# Patient Record
Sex: MALE | Race: WHITE | NOT HISPANIC OR LATINO
[De-identification: names, ages, dates, MRNs, and addresses within clinical notes are randomized per-mention and may not be internally consistent; named-entity substitution may affect disease eponyms.]

---

## 2017-11-15 ENCOUNTER — TRANSCRIPTION ENCOUNTER (OUTPATIENT)
Age: 22
End: 2017-11-15

## 2022-06-21 PROBLEM — Z00.00 ENCOUNTER FOR PREVENTIVE HEALTH EXAMINATION: Status: ACTIVE | Noted: 2022-06-21

## 2022-06-29 ENCOUNTER — FORM ENCOUNTER (OUTPATIENT)
Age: 27
End: 2022-06-29

## 2022-06-30 ENCOUNTER — APPOINTMENT (OUTPATIENT)
Dept: ORTHOPEDIC SURGERY | Facility: CLINIC | Age: 27
End: 2022-06-30

## 2022-06-30 ENCOUNTER — APPOINTMENT (OUTPATIENT)
Dept: MRI IMAGING | Facility: CLINIC | Age: 27
End: 2022-06-30

## 2022-06-30 VITALS — WEIGHT: 185 LBS | HEIGHT: 76 IN | BODY MASS INDEX: 22.53 KG/M2

## 2022-06-30 DIAGNOSIS — Z78.9 OTHER SPECIFIED HEALTH STATUS: ICD-10-CM

## 2022-06-30 DIAGNOSIS — S39.012A STRAIN OF MUSCLE, FASCIA AND TENDON OF LOWER BACK, INITIAL ENCOUNTER: ICD-10-CM

## 2022-06-30 PROCEDURE — 72148 MRI LUMBAR SPINE W/O DYE: CPT

## 2022-06-30 PROCEDURE — 73502 X-RAY EXAM HIP UNI 2-3 VIEWS: CPT | Mod: RT

## 2022-06-30 PROCEDURE — 72100 X-RAY EXAM L-S SPINE 2/3 VWS: CPT

## 2022-06-30 PROCEDURE — 99203 OFFICE O/P NEW LOW 30 MIN: CPT

## 2022-06-30 RX ORDER — METHYLPREDNISOLONE 4 MG/1
4 TABLET ORAL
Qty: 1 | Refills: 0 | Status: ACTIVE | COMMUNITY
Start: 2022-06-30 | End: 1900-01-01

## 2022-06-30 NOTE — DATA REVIEWED
[MRI] : MRI [Lumbar Spine] : lumbar spine [I independently reviewed and interpreted images and report] : I independently reviewed and interpreted images and report [FreeTextEntry1] : 23.Aug.2018; Location: [facility] \par • 27.Aug.2018 This was reviewed. There is small right paracentral disc protrusion at L1-2. Degenerative disc disease at L5-S1 with mild central herniation \par

## 2022-06-30 NOTE — IMAGING
[de-identified] : The patient ambulates with  right antalgic gait\par \par Lumbosacral spine\par Inspection normal\par Mild to moderate tenderness paraspinals right lumbosacral region\par Straight leg raising is positive for lower back and leg pain at 30° on the right\par Motor exam lower extremities normal\par \par Hips\par No tenderness.\par No pain with passive range of motion\par \par Legs\par No swelling\par Calves are soft and nontender\par Posterior tibial pulse 2+ bilaterally [de-identified] : Reviewed and interpreted.  Pelvis AP with lateral right hip-negative [FreeTextEntry1] : Reviewed and interpreted.  Lumbosacral spine AP and lateral views-mild disc space narrowing at L5-S1

## 2022-06-30 NOTE — PHYSICAL EXAM
[Normal Mood and Affect] : normal mood and affect [Able to Communicate] : able to communicate [Well Developed] : well developed [Well Nourished] : well nourished

## 2022-06-30 NOTE — HISTORY OF PRESENT ILLNESS
[Lower back] : lower back [Right Leg] : right leg [Gradual] : gradual [8] : 8 [3] : 3 [Radiating] : radiating [Sharp] : sharp [Intermittent] : intermittent [Household chores] : household chores [Leisure] : leisure [Rest] : rest [Standing] : standing [Walking] : walking [Full time] : Work status: full time [de-identified] : Patient is a 26-year-old male with recurrence of lower back pain radiating down right leg over the past several weeks.  No injury.  He has moderate sharp pain radiating down his right leg.  Pain when bending and setting.  Pain when walking.  No numbness.  Mild weakness.  No loss of bowel bladder control.  Taking Tylenol p.r.n.  Seen today with his mother, Jayce [] : Post Surgical Visit: no [FreeTextEntry7] : R Upper Thigh  [de-identified] : 8/27/2018 [de-identified] : Dr. Roman  [de-identified] : 8/2018 [de-identified] : MRI [de-identified] :

## 2022-06-30 NOTE — DISCUSSION/SUMMARY
[Medication Risks Reviewed] : Medication risks reviewed [de-identified] : The patient will have MRI lumbosacral spine today\par He will try Medrol Dosepak.  He was instructed in how to take this\par He has sensitive stomach.  He will take Pepcid 20 mg q.d.\par The day after he finishes Medrol Dosepak, he can try Advil or Aleve p.r.n.\par Tylenol p.r.n.\par Methocarbamol 1-2 q.8 h. p.r.n. pain/spasm.  Not to take this if he is driving or needs to be alert\par If for any reason he develops any significant neurologic changes, significant weakness and lower extremities, loss of bowel bladder control, advised to go to emergency room immediately\par He is referred for physical therapy program.  He lives in [place], [place] and will find therapist there\par \par Impression:\par Lumbosacral strain/radiculopathy

## 2022-07-05 ENCOUNTER — NON-APPOINTMENT (OUTPATIENT)
Age: 27
End: 2022-07-05

## 2022-07-07 ENCOUNTER — APPOINTMENT (OUTPATIENT)
Dept: ORTHOPEDIC SURGERY | Facility: CLINIC | Age: 27
End: 2022-07-07

## 2022-07-07 VITALS — WEIGHT: 185 LBS | HEIGHT: 76 IN | BODY MASS INDEX: 22.53 KG/M2

## 2022-07-07 PROCEDURE — 99214 OFFICE O/P EST MOD 30 MIN: CPT

## 2022-07-08 NOTE — DATA REVIEWED
[MRI] : MRI [Lumbar Spine] : lumbar spine [I independently reviewed and interpreted images and report] : I independently reviewed and interpreted images and report [FreeTextEntry1] : MRI lumbosacral spine done June 30, 2022 was reviewed. There is mild to moderate right paracentral disc herniation with extrusion at L5-S1. Also findings suggesting perineural cyst right neural foramen measuring approximately 8 mm which appears nonaggressive \par

## 2022-07-08 NOTE — IMAGING
[de-identified] : The patient ambulates with  right antalgic gait\par \par Lumbosacral spine\par Inspection normal\par Mild to moderate tenderness paraspinals right lumbosacral region\par Straight leg raising is positive for lower back and leg pain at 40° on the right\par Motor exam lower extremities normal\par \par Hips\par No tenderness.\par No pain with passive range of motion\par \par Legs\par No swelling\par Calves are soft and nontender\par Posterior tibial pulse 2+ bilaterally

## 2022-07-08 NOTE — DISCUSSION/SUMMARY
[Medication Risks Reviewed] : Medication risks reviewed [de-identified] : MRI lumbosacral spine was discussed with the patient and his mother\par Meloxicam 15 mg q.d. with food p.r.n.\par He has sensitive stomach.  He will take Pepcid 20 mg q.d.\par Tylenol p.r.n.\par Methocarbamol 1-2 q.8 h. p.r.n. pain/spasm.  Not to take this if he is driving or needs to be alert\par If for any reason he develops any significant neurologic changes, significant weakness and lower extremities, loss of bowel bladder control, advised to go to emergency room immediately\par He is referred for physical therapy program.  He lives in [place], [place] and will find therapist there\par He will have pain management consult\par Recommend he have spine consult with Dr. Dileep Lowery for possible discectomy if ongoing symptoms\par \par Impression:\par Lumbosacral strain/radiculopathy

## 2022-07-08 NOTE — HISTORY OF PRESENT ILLNESS
[Lower back] : lower back [Gradual] : gradual [8] : 8 [Radiating] : radiating [Sharp] : sharp [Constant] : constant [Household chores] : household chores [Sleep] : sleep [Full time] : Work status: full time [de-identified] : The patient has had minimal improvement after completing Medrol Dosepak.  He had MRI lumbosacral spine.\par He has moderate sharp pain radiating down his right leg.  Pain when standing and walking.  Pain when bending and sitting.  Mild weakness right leg.  No numbness.  Seen today with his mother, Jayce [] : Post Surgical Visit: no [FreeTextEntry7] : R Leg  [de-identified] : MRI

## 2022-07-11 ENCOUNTER — APPOINTMENT (OUTPATIENT)
Dept: ORTHOPEDIC SURGERY | Facility: CLINIC | Age: 27
End: 2022-07-11

## 2022-07-11 VITALS — WEIGHT: 185 LBS | BODY MASS INDEX: 22.53 KG/M2 | HEIGHT: 76 IN

## 2022-07-11 DIAGNOSIS — Z78.9 OTHER SPECIFIED HEALTH STATUS: ICD-10-CM

## 2022-07-11 PROCEDURE — 99214 OFFICE O/P EST MOD 30 MIN: CPT

## 2022-07-11 NOTE — DISCUSSION/SUMMARY
[de-identified] : reviewed the MRi with him - L5-S1 disc herniation - discussion of tx options \par \par PT/pain management \par \par if not getting better would consider for right sided decompression at L5-S1 - fu with me if not getting better conservative tx \par \par

## 2022-07-11 NOTE — HISTORY OF PRESENT ILLNESS
[Lower back] : lower back [8] : 8 [7] : 7 [Sharp] : sharp [Constant] : constant [Full time] : Work status: full time [de-identified] : 7/11/22:27 yo M here for eval of low back pain for 2 months. Consult from Dr Roman. Denies injury. Has had a hx of minor back problems in years past. \par \par He sent MDP/ muscle relaxer/ NSAID which has been some help and got MRI. \par \par office \par \par healthy otherwise \par \par Never seen Chiro/ acupuncture  \par Starting PT and seeing Pain management \par \par L-Spine MRI: 1. Broad-based right paracentral extrusion with caudad extension along with disc building at L5-S1 resulting in \par impingement with posterior displacement on right greater than left S1 nerve roots and right greater than left \par lateral recess stenosis as well as probable impingement upon the right greater than left exiting L5 nerve roots \par and right greater than left foraminal narrowing. There is a peroneal cyst in the right neuroforamen, which \par appears nonaggressive.\par 2. Small right paracentral protrusion effacing the thecal sac at L1-L2, slight convexity of the upper lumbar spine \par towards the left, and slight degenerative endplate changes asymmetric towards the left at L5-S1.\par \par  [] : no [FreeTextEntry5] : no specific injury- woke up with pain 2 months ago in lower back [FreeTextEntry7] : R leg [de-identified] : [employer]k [employer]

## 2022-07-15 ENCOUNTER — APPOINTMENT (OUTPATIENT)
Dept: PAIN MANAGEMENT | Facility: CLINIC | Age: 27
End: 2022-07-15

## 2022-07-15 VITALS — BODY MASS INDEX: 23.14 KG/M2 | HEIGHT: 76 IN | WEIGHT: 190 LBS

## 2022-07-15 DIAGNOSIS — M54.50 LOW BACK PAIN, UNSPECIFIED: ICD-10-CM

## 2022-07-15 DIAGNOSIS — M54.17 RADICULOPATHY, LUMBOSACRAL REGION: ICD-10-CM

## 2022-07-15 PROCEDURE — 99204 OFFICE O/P NEW MOD 45 MIN: CPT

## 2022-07-15 NOTE — PHYSICAL EXAM
[de-identified] : Constitutional; Appears well, no apparent distress\par Ability to communicate: Normal \par Respiratory: non-labored breathing\par Skin: No rash noted\par Head: Normocephalic, atraumatic\par Neck: no visible thyroid enlargement\par Eyes: Extraocular movements intact\par Neurologic: Alert and oriented x3\par Psychiatric: normal mood, affect and behavior  [Flexion] : flexion [] : non-antalgic

## 2022-07-15 NOTE — HISTORY OF PRESENT ILLNESS
[Lower back] : lower back [8] : 8 [3] : 3 [Radiating] : radiating [Sharp] : sharp [Tingling] : tingling [Squeezing] : squeezing [Intermittent] : intermittent [Household chores] : household chores [Meds] : meds [Walking] : walking [] : no [FreeTextEntry1] : right  [FreeTextEntry7] : right post thigh  [FreeTextEntry9] : walking long time, meloxicam  [de-identified] : morning  [de-identified] : L MRI

## 2022-07-15 NOTE — DISCUSSION/SUMMARY
[de-identified] : After discussing various treatment options with the patient including but not limited to oral medications, physical therapy, exercise modalities as well as interventional spinal injections, we have decided with the following plan:\par \par - Continue Home exercises, stretching, activity modification, physical therapy, and conservative care.\par - MRI report and/or images was reviewed and discussed with the patient.\par - Recommend Right L5-S1 & S1 Transforaminal Epidural Steroid Injection under fluoroscopic guidance with image.\par - The risks, benefits and alternatives of the proposed procedure were explained in detail with the patient. The risks outlined include but are not limited to infection, bleeding, post-dural puncture headache, nerve injury, a temporary increase in pain, failure to resolve symptoms, allergic reaction, symptom recurrence, and possible elevation of blood sugar in diabetics. All questions were answered to patient's apparent satisfaction and he/she verbalized an understanding.\par - Patient is presenting with acute/sub-acute radicular pain with impairment in ADLs and functionality.  The pain has not responded to conservative care including NSAID therapy and/or physical therapy.  There is no bleeding tendency, unstable medical condition, or systemic infection.\par - Follow up in 1-2 weeks post injection for re-evaluation.\par

## 2022-08-15 ENCOUNTER — APPOINTMENT (OUTPATIENT)
Dept: PAIN MANAGEMENT | Facility: CLINIC | Age: 27
End: 2022-08-15

## 2022-08-30 ENCOUNTER — APPOINTMENT (OUTPATIENT)
Dept: PAIN MANAGEMENT | Facility: CLINIC | Age: 27
End: 2022-08-30

## 2022-09-14 ENCOUNTER — APPOINTMENT (OUTPATIENT)
Dept: ORTHOPEDIC SURGERY | Facility: CLINIC | Age: 27
End: 2022-09-14

## 2022-09-22 ENCOUNTER — APPOINTMENT (OUTPATIENT)
Dept: ORTHOPEDIC SURGERY | Facility: CLINIC | Age: 27
End: 2022-09-22

## 2022-09-22 VITALS — BODY MASS INDEX: 23.14 KG/M2 | HEIGHT: 76 IN | WEIGHT: 190 LBS

## 2022-09-22 DIAGNOSIS — S39.012D STRAIN OF MUSCLE, FASCIA AND TENDON OF LOWER BACK, SUBSEQUENT ENCOUNTER: ICD-10-CM

## 2022-09-22 PROCEDURE — 99214 OFFICE O/P EST MOD 30 MIN: CPT

## 2022-09-22 RX ORDER — MELOXICAM 15 MG/1
15 TABLET ORAL
Qty: 30 | Refills: 1 | Status: ACTIVE | COMMUNITY
Start: 2022-07-07 | End: 1900-01-01

## 2022-09-22 RX ORDER — METHOCARBAMOL 500 MG/1
500 TABLET, FILM COATED ORAL
Qty: 60 | Refills: 1 | Status: ACTIVE | COMMUNITY
Start: 2022-06-30 | End: 1900-01-01

## 2022-09-22 NOTE — HISTORY OF PRESENT ILLNESS
[Lower back] : lower back [Gradual] : gradual [7] : 7 [Dull/Aching] : dull/aching [Radiating] : radiating [Intermittent] : intermittent [Rest] : rest [Sitting] : sitting [Full time] : Work status: full time [de-identified] : The patient has continued mild to moderate pain in his right lower back radiating down his right leg.  Pain when standing and walking.  Pain when bending and sitting.  No weakness.  Taking meloxicam and methocarbamol p.r.n. He did have some improvement after physical therapy.  He is scheduled for epidural injection with Dr. Erich De La Rosa on October 12 [] : Post Surgical Visit: no [FreeTextEntry7] : R Leg  [de-identified] : 7/15/2022 [de-identified] : Dr. De La Rosa [de-identified] : 9/20/2022 [de-identified] : MRI

## 2022-09-22 NOTE — DISCUSSION/SUMMARY
[de-identified] : Continue physical therapy 2-3 times a week\par Continue meloxicam 15 mg q.d. with food p.r.n.\par Continue Pepcid 20 mg q.d.\par Continue methocarbamol 1-2 q.8 h. p.r.n. pain/spasm.  Not to take this if he is driving or needs to be alert\par He is going to proceed with epidural injection with Dr. Erich De La Rosa on October 12\par If he is not improving, recommend he has followup consult with Dr. Dileep Lowery to discuss surgery\par If for any reason he develops any significant neurologic changes, weakness and lower extremities, loss of bowel bladder control, advised to go to emergency room immediately\par \par Impression:\par Lumbosacral radiculopathy/herniated disc right L5-S1

## 2022-09-22 NOTE — IMAGING
[de-identified] : The patient ambulates with  right antalgic gait\par \par Lumbosacral spine\par Inspection normal\par Mild tenderness paraspinals right lumbosacral region\par Straight leg raising is positive for lower back and leg pain at 40° on the right\par Motor exam lower extremities normal\par \par Hips\par No tenderness.\par No pain with passive range of motion\par \par Legs\par No swelling\par Calves are soft and nontender\par Posterior tibial pulse 2+ bilaterally

## 2022-10-09 ENCOUNTER — NON-APPOINTMENT (OUTPATIENT)
Age: 27
End: 2022-10-09

## 2022-10-12 ENCOUNTER — APPOINTMENT (OUTPATIENT)
Age: 27
End: 2022-10-12

## 2022-10-12 PROCEDURE — 64484 NJX AA&/STRD TFRM EPI L/S EA: CPT | Mod: 59,RT

## 2022-10-12 PROCEDURE — 64483 NJX AA&/STRD TFRM EPI L/S 1: CPT | Mod: RT

## 2022-10-28 ENCOUNTER — APPOINTMENT (OUTPATIENT)
Dept: PAIN MANAGEMENT | Facility: CLINIC | Age: 27
End: 2022-10-28

## 2022-10-28 VITALS — HEIGHT: 76 IN | BODY MASS INDEX: 23.14 KG/M2 | WEIGHT: 190 LBS

## 2022-10-28 DIAGNOSIS — M51.26 OTHER INTERVERTEBRAL DISC DISPLACEMENT, LUMBAR REGION: ICD-10-CM

## 2022-10-28 DIAGNOSIS — M54.16 RADICULOPATHY, LUMBAR REGION: ICD-10-CM

## 2022-10-28 PROCEDURE — 99213 OFFICE O/P EST LOW 20 MIN: CPT

## 2022-10-28 NOTE — DISCUSSION/SUMMARY
[de-identified] : After discussing various treatment options with the patient including but not limited to oral medications, physical therapy, exercise modalities as well as interventional spinal injections, we have decided with the following plan:\par \par - Continue Home exercises, stretching, activity modification, physical therapy, and conservative care.\par - MRI report and/or images was reviewed and discussed with the patient.\par - Recommend L5-S1 Lumbar Epidural Steroid Injection under fluoroscopic guidance with image. (RIGHT)\par - The risks, benefits and alternatives of the proposed procedure were explained in detail with the patient. The risks outlined include but are not limited to infection, bleeding, post-dural puncture headache, nerve injury, a temporary increase in pain, failure to resolve symptoms, allergic reaction, symptom recurrence, and possible elevation of blood sugar in diabetics. All questions were answered to patient's apparent satisfaction and he/she verbalized an understanding.\par - Patient is presenting with acute/sub-acute radicular pain with impairment in ADLs and functionality.  The pain has not responded to conservative care including NSAID therapy and/or physical therapy.  There is no bleeding tendency, unstable medical condition, or systemic infection.\par - Follow up in 1-2 weeks post injection for re-evaluation.\par - Will call to schedule.\par \par

## 2022-10-28 NOTE — HISTORY OF PRESENT ILLNESS
[Lower back] : lower back [Radiating] : radiating [Intermittent] : intermittent [Meds] : meds [Walking] : walking [3] : 3 [Dull/Aching] : dull/aching [Rest] : rest [Injection therapy] : injection therapy [] : no [FreeTextEntry1] : right  [FreeTextEntry7] : right post thigh  [de-identified] : morning  [FreeTextEntry9] : walking long time [de-identified] : L MRI

## 2022-10-28 NOTE — PHYSICAL EXAM
[de-identified] : Constitutional; Appears well, no apparent distress\par Ability to communicate: Normal \par Respiratory: non-labored breathing\par Skin: No rash noted\par Head: Normocephalic, atraumatic\par Neck: no visible thyroid enlargement\par Eyes: Extraocular movements intact\par Neurologic: Alert and oriented x3\par Psychiatric: normal mood, affect and behavior  [] : non-antalgic

## 2022-11-10 ENCOUNTER — NON-APPOINTMENT (OUTPATIENT)
Age: 27
End: 2022-11-10